# Patient Record
(demographics unavailable — no encounter records)

---

## 2024-12-05 NOTE — DISCUSSION/SUMMARY
[de-identified] : Left shoulder follow-up  HPI Patient is a 51-year-old female who reports to office for subsequent reevaluation of her left shoulder pain.  She is status post left shoulder arthroscopy/rotator cuff repair and biceps tenodesis done on 6/19/2024 by Dr. Martínez. She has been undergoing formal physical therapy and been doing at home exercises which have been giving her relief. Her range of motion has been improving. She takes Aleve as needed which gives her relief. Denies any numbness or tingling.  Left shoulder exam is as follows: No swelling noted. No erythema or ecchymosis. Well-healed incision sites. Active forward flexion/abduction to approximate 175 degrees, passive forward flexion/abduction to 180 degrees with mild stiffness and pain. Internal rotation to L4 and external rotation to 90 degrees with mild pain. There is decent strength and strength is improving. Light touch intact throughout.  Assessment/plan Patient is doing well status post surgery. Her pain, range of motion, and strength have all been improving.  She will continue formal PT as directed and a new prescription was provided for her.  Continue Aleve as needed for pain.  The shoulder conditioning program from the [facility] was given to the patient so they may try that at home.  Follow-up in 3 to 4 months.  May consider injections in future if still symptomatic.  All questions/concerns were answered in detail.
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
## 2025-03-06 NOTE — PROCEDURE
[Large Joint Injection] : Large joint injection [Left] : of the left [Shoulder] : shoulder [Pain] : pain [Alcohol] : alcohol [Ethyl Chloride sprayed topically] : ethyl chloride sprayed topically [Sterile technique used] : sterile technique used [____] : [unfilled] [] : Patient tolerated procedure well [Call if redness, pain or fever occur] : call if redness, pain or fever occur [Apply ice for 15min out of every hour for the next 12-24 hours as tolerated] : apply ice for 15 minutes out of every hour for the next 12-24 hours as tolerated [Risks, benefits, alternatives discussed / Verbal consent obtained] : the risks benefits, and alternatives have been discussed, and verbal consent was obtained
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
## 2025-05-27 NOTE — PROCEDURE
[Normal] : posterior cricoid area had healthy pink mucosa in the interarytenoid area and the esophageal inlet [de-identified] : NO abnormal BOT swelling or masses
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
## 2025-05-27 NOTE — DATA REVIEWED
[de-identified] :  reviewed and interpreted by me. B/L KATLYNL  [de-identified] : Test was reviewed  and interpreted by me. See official report below. T BRAIN ORDERED BY: WELLINGTON KWAME  PROCEDURE DATE: 05/14/2025    INTERPRETATION: CLINICAL INFORMATION: head injury  Technique: Noncontrast head CT. Contiguous CT axial images of the head from the base to the vertex with coronal and sagittal reformats.  Comparison/correlation: CT head 7/8/2015  Findings:  The ventricles and cortical sulci are normal in size and configuration. There is no acute intracranial hemorrhage, extra-axial fluid collection or midline shift. Gray-white matter differentiation is maintained.  The visualized paranasal sinuses and mastoids are clear.  IMPRESSION:  No evidence of acute intracranial pathology.   CT CERVICAL SPINE ORDERED BY: WELLINGTON KWAME  PROCEDURE DATE: 05/14/2025    INTERPRETATION: Clinical history: Head injury  TECHNIQUE: CT cervical spine without contrast. Contiguous CT axial images of the cervical spine with coronal and sagittal reformats.  COMPARISON/CORRELATION: None available  FINDINGS:  There is no evidence of acute fracture, compression deformity or facet subluxation.  Is disc space heights are maintained.  At C3-4 there is uncinate and facet hypertrophy with mild bilateral foraminal stenosis.  At C4-5 there is uncinate and facet hypertrophy with mild right foraminal stenosis.  At C5-6 there is uncinate and facet hypertrophy with mild left foraminal stenosis.  There is nodular enlargement of the tongue base soft tissues.  IMPRESSION:  1. No evidence of acute osseous abnormality. 2. Mild degenerative changes. 3. Nodular enlargement of the tongue base soft tissues. Recommend direct visualization.    ACC: [id number] EXAM: CT CERVICAL SPINE ORDERED BY: WELLINGTON KWAME  PROCEDURE DATE: 05/14/2025    INTERPRETATION: Clinical history: Head injury  TECHNIQUE: CT cervical spine without contrast. Contiguous CT axial images of the cervical spine with coronal and sagittal reformats.  COMPARISON/CORRELATION: None available  FINDINGS:  There is no evidence of acute fracture, compression deformity or facet subluxation.  Is disc space heights are maintained.  At C3-4 there is uncinate and facet hypertrophy with mild bilateral foraminal stenosis.  At C4-5 there is uncinate and facet hypertrophy with mild right foraminal stenosis.  At C5-6 there is uncinate and facet hypertrophy with mild left foraminal stenosis.  There is nodular enlargement of the tongue base soft tissues.  IMPRESSION:  1. No evidence of acute osseous abnormality. 2. Mild degenerative changes. 3. Nodular enlargement of the tongue base soft tissues. Recommend direct visualization.  --- End of Report ---   ACC: [id number] EXAM: CT CERVICAL SPINE ORDERED BY: WELLINGTON PUENTE  PROCEDURE DATE: 05/14/2025    INTERPRETATION: Clinical history: Head injury  TECHNIQUE: CT cervical spine without contrast. Contiguous CT axial images of the cervical spine with coronal and sagittal reformats.  COMPARISON/CORRELATION: None available  FINDINGS:  There is no evidence of acute fracture, compression deformity or facet subluxation.  Is disc space heights are maintained.  At C3-4 there is uncinate and facet hypertrophy with mild bilateral foraminal stenosis.  At C4-5 there is uncinate and facet hypertrophy with mild right foraminal stenosis.  At C5-6 there is uncinate and facet hypertrophy with mild left foraminal stenosis.  There is nodular enlargement of the tongue base soft tissues.  IMPRESSION:  1. No evidence of acute osseous abnormality. 2. Mild degenerative changes. 3. Nodular enlargement of the tongue base soft tissues. Recommend direct visualization.  --- End of Report ---
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
## 2025-07-16 NOTE — IMAGING
[de-identified] : On examination of the right upper extremity, patient has some tenderness over the lateral and medial epicondyle, tenderness over the triceps tendon. Patient has decreased motor strength to the triceps, pain with full extension and flexion of the elbow. Positive resisted wrist flexion. Patient has tenderness over the top of the shoulder, tenderness over the right trapezius. Range of motion of the cervical spine. Negative Spurling's.   Radiographs of the right Elbow, negative for any acute fracture or dislocation, 3 views were taken. Radiographs of the cervical spine, 2 views, negative for any acute fracture or dislocation, mild degenerative changes

## 2025-07-16 NOTE — DISCUSSION/SUMMARY
[de-identified] : Impression: Right elbow tendinitis, mild tennis elbow and possible cervical strain  Plan: Patient was advised for physical therapy, muscle relaxant was sent to the pharmacy, patient is taking anti-inflammatories already.  Follow-up: As needed

## 2025-07-16 NOTE — HISTORY OF PRESENT ILLNESS
[de-identified] : 52-year-old female here for an evaluation of pain to the right elbow, patient states this pain started on about July 14, 2025, patient states that she was sleeping holding a rescue puppy and as she tried to get up she hyperextended her elbow since then she is having significant pain on the elbow, patient states that the pain radiates to the top of the shoulder and to the forearm.